# Patient Record
Sex: FEMALE | Race: WHITE
[De-identification: names, ages, dates, MRNs, and addresses within clinical notes are randomized per-mention and may not be internally consistent; named-entity substitution may affect disease eponyms.]

---

## 2019-02-27 ENCOUNTER — HOSPITAL ENCOUNTER (EMERGENCY)
Dept: HOSPITAL 92 - SCSER | Age: 34
Discharge: HOME | End: 2019-02-27
Payer: MEDICAID

## 2019-02-27 DIAGNOSIS — R10.31: ICD-10-CM

## 2019-02-27 DIAGNOSIS — O99.89: Primary | ICD-10-CM

## 2019-02-27 DIAGNOSIS — Z3A.01: ICD-10-CM

## 2019-02-27 LAB
ALBUMIN SERPL BCG-MCNC: 4.3 G/DL (ref 3.5–5)
ALP SERPL-CCNC: 66 U/L (ref 40–150)
ALT SERPL W P-5'-P-CCNC: 22 U/L (ref 8–55)
ANION GAP SERPL CALC-SCNC: 13 MMOL/L (ref 10–20)
AST SERPL-CCNC: 15 U/L (ref 5–34)
BILIRUB SERPL-MCNC: 0.4 MG/DL (ref 0.2–1.2)
BUN SERPL-MCNC: 10 MG/DL (ref 7–18.7)
CALCIUM SERPL-MCNC: 9.5 MG/DL (ref 7.8–10.44)
CHLORIDE SERPL-SCNC: 105 MMOL/L (ref 98–107)
CO2 SERPL-SCNC: 23 MMOL/L (ref 22–29)
CREAT CL PREDICTED SERPL C-G-VRATE: 0 ML/MIN (ref 70–130)
GLOBULIN SER CALC-MCNC: 3 G/DL (ref 2.4–3.5)
GLUCOSE SERPL-MCNC: 100 MG/DL (ref 70–105)
HGB BLD-MCNC: 11 G/DL (ref 12–16)
HGB BLD-MCNC: 13.1 G/DL (ref 12–16)
MCH RBC QN AUTO: 29.4 PG (ref 27–31)
MCV RBC AUTO: 87.5 FL (ref 78–98)
MDIFF COMPLETE?: YES
PLATELET # BLD AUTO: 247 THOU/UL (ref 130–400)
POTASSIUM SERPL-SCNC: 4.1 MMOL/L (ref 3.5–5.1)
RBC # BLD AUTO: 4.46 MILL/UL (ref 4.2–5.4)
SODIUM SERPL-SCNC: 137 MMOL/L (ref 136–145)
WBC # BLD AUTO: 11 THOU/UL (ref 4.8–10.8)

## 2019-02-27 PROCEDURE — 84702 CHORIONIC GONADOTROPIN TEST: CPT

## 2019-02-27 PROCEDURE — 85025 COMPLETE CBC W/AUTO DIFF WBC: CPT

## 2019-02-27 PROCEDURE — 96361 HYDRATE IV INFUSION ADD-ON: CPT

## 2019-02-27 PROCEDURE — 76856 US EXAM PELVIC COMPLETE: CPT

## 2019-02-27 PROCEDURE — 80053 COMPREHEN METABOLIC PANEL: CPT

## 2019-02-27 PROCEDURE — 96374 THER/PROPH/DIAG INJ IV PUSH: CPT

## 2019-02-27 NOTE — PDOC.EVN
Event Note





- Event Note


Event Note: 





OBGYN Consult from [facility] ED





Called by ED physician on 19 at 1245





CC: BHCG 3292 with no POC in uterus





HPI: 32 yo H with non-bleeding, mild pelvic pain. No HX clots or VB. Seen 

at that location with BHCG of 3296 and possible right "mas" under 1.5cm. I was 

called for possible ectopic care.





Review of Systems: as per HPI





Discussion: I talked with the patient on speaker phone along with Dr Ward. 

In [place], I discussed her findings and the possibility of ectopic. I 

discussed Methotrxate with her as emperic RX. The patient was a bit unsure of 

methotrexate, as she was concerned if the medication was given to a good 

pregnancy in error. I shared with her that she had a valid concern.





Dr Ward and I reviewed the 2018 ACOG New Bulletin on Ectopic, with 3500 set 

as the new "discriminatory" zone in stable patients.





Labs:


Hct 3296





RH type pending


HCT 39





Sono: as per HPI:


No free fluid on sono





No known risk factors for ectopic.





Assessment and plan:





Preg unknown location with unsure "mass" at under 1.5cm on right and BHCG 3296 

with no IUP. No VB


After detailed discussion on both emperic methotrexate vs expectant management, 

she has agreed on and prefers expectant care. I reviewed potential risks of 

tubal rupture if ectopic, and we have settled on short term follow up...repeat 

labs in 24-48 hours and see what BHCG doing as she is hemodynamically stable.





I have addressed this and ED precautions as well.





She will follow up with [facility] (Dr Ward to arrange) in 24-48 hours.





As misscarriages are more common than ectopic, I think this plan of close 

follow up is valid in light of the newer more conservative ACOG guidelines.





I have discussed this on the phone with Dr KIKO Ward and the patient in [place] 

via telemedicine.

## 2019-02-27 NOTE — ULT
PELVIC ULTRASOUND:

 

02/27/2019

 

 

HISTORY:

Positive outside urine pregnancy test.  Beta hCG level is not currently available.  The patient is ha
ving right pelvic pain.

 

TECHNIQUE:

Transabdominal and endovaginal sonographic images of the pelvis are obtained.

 

FINDINGS:

The uterus measures 8.8 cm x 4.4 cm x 5.6 cm.  The endometrial stripe measures 0.9 cm in thickness.  
No fluid or fluid collection is seen in the endometrial canal.

 

The ovaries are not visualized bilaterally.  There is a heterogeneous structures seen in the right ad
nexa, which measures 3 cm x 1.7 cm, and is in the expected location of the right ovary but does not h
ave the typical appearance to suggest that this does represent the right ovary.  An anechoic area is 
seen centrally, within this heterogeneous region.  In addition, there is adjacent fluid with increase
d echogenicity, suggesting complicated fluid within the right adnexal region.  The area of heterogene
ity in the right adnexal region measures 4.4 cm x 3 cm with a central area of decreased echogenicity 
measuring approximately 1.3 cm.  Ectopic pregnancy is a consideration, given the complicated adjacent
 fluid.

 

The left ovary is obscured by bowel gas.

 

A tiny amount of free fluid is seen adjacent to the inferior margin of the right hepatic lobe.

 

IMPRESSION:

1.  No fluid collection is seen in the endometrial canal to suggest an intrauterine gestation.  While
 findings could be related to an early intrauterine gestation, there is a heterogeneous structure wit
hin the right adnexal region, with adjacent complicated fluid.  Ectopic pregnancy cannot be excluded 
on this exam.  Correlation with quantitative beta hCG level is recommended, and ectopic pregnancy is 
a consideration.

 

2.  Tiny amount of free fluid adjacent to the right hepatic lobe.

 

3.  Non visualization of either ovary.

 

The above findings were discussed with Dr. Ward in the emergency department on 02/27/2019 at 1227 
hours.

 

CODE CR

 

POS: [facility]

## 2019-02-27 NOTE — ULT
PELVIC ULTRASOUND:

 

Date:  02/27/19 

 

HISTORY:  

Patient has a positive pregnancy test and features on an earlier ultrasound done today that raised th
e possibility of an ectopic in the right adnexal region. Patient now has increasing pain and H&H has 
been dropping. 

 

FINDINGS:

Real-time imaging of the pelvis was performed. Transabdominal, as well as endovaginal images obtained
. 

 

The overall quality of the examination is less on visualized adnexal structures as compared to the pr
ior exam. The uterus is once again noted to be normal in appearance. No signs that would suggest any 
type of intrauterine gestation. There is heterogeneity in the region of the right adnexa. Some of thi
s probably represents hemorrhage as there appears to be somewhat complex fluid. It is difficult to sa
y whether there has been a significant change since the previous examination as this and adjacent bow
el all merge. There was some minimal free fluid in Morison's pouch noted on the prior exam that is st
ill present, but not significantly different. The left ovary is never well visualized. 

 

IMPRESSION: 

Heterogeneity in the region of the right adnexa, which is ill-defined. It is difficult to say whether
 or not there has been a significant change in the appearance of the right adnexal region as compared
 to the prior exam. There is not a large amount of free fluid demonstrated. There is what appears to 
be some complex fluid difficult to define due to its similar echogenicity to adjacent structures. 

 

 

 

POS: [facility]

## 2019-10-04 ENCOUNTER — HOSPITAL ENCOUNTER (OUTPATIENT)
Dept: HOSPITAL 92 - SCSULT | Age: 34
Discharge: HOME | End: 2019-10-04
Attending: NURSE PRACTITIONER
Payer: MEDICAID

## 2019-10-04 DIAGNOSIS — N83.201: ICD-10-CM

## 2019-10-04 DIAGNOSIS — Z01.419: Primary | ICD-10-CM

## 2019-10-04 PROCEDURE — 76856 US EXAM PELVIC COMPLETE: CPT

## 2019-10-04 PROCEDURE — 93976 VASCULAR STUDY: CPT

## 2019-10-04 NOTE — ULT
ULTRASOUND PELVIC

DOPPLER DUPLEX:



DATE:

10/4/2019



HISTORY:

34-year-old female with right pelvic pain



TECHNIQUE:

Transabdominal transducer used to visualize intrapelvic contents with grayscale, color-flow, and spec
tral analysis. Endovaginal transducer not used.



FINDINGS:

Uterus: 9.5 x 4 x 5 cm.

Endometrial stripe: 0.7 cm (7 mm)

No moderate sized or large uterine leiomyoma identified.

Right ovary: 5.5 x 4 x 4.5 cm, expanded by a 4 x 3 x 2.5 cm cyst.

Left ovary: 3.5 x 1.5 x 2 cm.

Blood flow documented in bilateral ovaries.

No free fluid in the cul-de-sac



IMPRESSION:

4 cm right ovarian cyst.



Reported By: Vega Bustamante 

Electronically Signed:  10/4/2019 12:10 PM

## 2022-08-15 ENCOUNTER — HOSPITAL ENCOUNTER (OUTPATIENT)
Dept: HOSPITAL 92 - CSHULT | Age: 37
Discharge: HOME | End: 2022-08-15
Attending: OBSTETRICS & GYNECOLOGY
Payer: MEDICAID

## 2022-08-15 DIAGNOSIS — Z3A.21: ICD-10-CM

## 2022-08-15 DIAGNOSIS — O09.522: Primary | ICD-10-CM

## 2022-08-15 PROCEDURE — 76805 OB US >/= 14 WKS SNGL FETUS: CPT

## 2022-12-09 LAB
BASOPHILS # BLD AUTO: 0.1 10X3/UL (ref 0–0.2)
BASOPHILS NFR BLD AUTO: 0.9 % (ref 0–2)
EOSINOPHIL # BLD AUTO: 0.1 10X3/UL (ref 0–0.5)
EOSINOPHIL NFR BLD AUTO: 1.4 % (ref 0–6)
HBSAG INDEX: 0.32 S/CO (ref 0–0.99)
HGB BLD-MCNC: 11.3 G/DL (ref 12–15.5)
LYMPHOCYTES NFR BLD AUTO: 19.8 % (ref 18–47)
MCH RBC QN AUTO: 28 PG (ref 27–33)
MCV RBC AUTO: 81.2 FL (ref 81.6–98.3)
MONOCYTES # BLD AUTO: 0.4 10X3/UL (ref 0–1.1)
MONOCYTES NFR BLD AUTO: 5.9 % (ref 0–10)
NEUTROPHILS # BLD AUTO: 4.7 10X3/UL (ref 1.5–8.4)
NEUTROPHILS NFR BLD AUTO: 70.5 % (ref 40–75)
PLATELET # BLD AUTO: 168 10X3/UL (ref 150–450)
RBC # BLD AUTO: 4.04 10X6/UL (ref 3.9–5.03)
SYPHILIS ANTIBODY INDEX: 0.03 S/CO
WBC # BLD AUTO: 6.6 10X3/UL (ref 3.5–10.5)

## 2022-12-12 ENCOUNTER — HOSPITAL ENCOUNTER (INPATIENT)
Dept: HOSPITAL 92 - CSHLD | Age: 37
LOS: 3 days | Discharge: HOME | End: 2022-12-15
Attending: OBSTETRICS & GYNECOLOGY | Admitting: OBSTETRICS & GYNECOLOGY
Payer: COMMERCIAL

## 2022-12-12 VITALS — BODY MASS INDEX: 39.2 KG/M2

## 2022-12-12 DIAGNOSIS — O34.211: Primary | ICD-10-CM

## 2022-12-12 DIAGNOSIS — Z20.822: ICD-10-CM

## 2022-12-12 DIAGNOSIS — Z3A.39: ICD-10-CM

## 2022-12-12 DIAGNOSIS — Z79.899: ICD-10-CM

## 2022-12-12 PROCEDURE — 51702 INSERT TEMP BLADDER CATH: CPT

## 2022-12-12 PROCEDURE — 87340 HEPATITIS B SURFACE AG IA: CPT

## 2022-12-12 PROCEDURE — 36415 COLL VENOUS BLD VENIPUNCTURE: CPT

## 2022-12-12 PROCEDURE — U0002 COVID-19 LAB TEST NON-CDC: HCPCS

## 2022-12-12 PROCEDURE — 99285 EMERGENCY DEPT VISIT HI MDM: CPT

## 2022-12-12 PROCEDURE — 86850 RBC ANTIBODY SCREEN: CPT

## 2022-12-12 PROCEDURE — 86780 TREPONEMA PALLIDUM: CPT

## 2022-12-12 PROCEDURE — 86901 BLOOD TYPING SEROLOGIC RH(D): CPT

## 2022-12-12 PROCEDURE — 86900 BLOOD TYPING SEROLOGIC ABO: CPT

## 2022-12-12 PROCEDURE — 85027 COMPLETE CBC AUTOMATED: CPT

## 2022-12-12 PROCEDURE — 85025 COMPLETE CBC W/AUTO DIFF WBC: CPT

## 2022-12-12 PROCEDURE — S0028 INJECTION, FAMOTIDINE, 20 MG: HCPCS

## 2022-12-13 RX ADMIN — HYDROCODONE BITARTRATE AND ACETAMINOPHEN PRN TAB: 5; 325 TABLET ORAL at 15:42

## 2022-12-13 RX ADMIN — HYDROCODONE BITARTRATE AND ACETAMINOPHEN PRN TAB: 5; 325 TABLET ORAL at 08:55

## 2022-12-13 RX ADMIN — HYDROCODONE BITARTRATE AND ACETAMINOPHEN PRN TAB: 5; 325 TABLET ORAL at 21:53

## 2022-12-14 LAB
HGB BLD-MCNC: 8.9 G/DL (ref 12–15.5)
MCH RBC QN AUTO: 28.2 PG (ref 27–33)
MCV RBC AUTO: 83.2 FL (ref 81.6–98.3)
PLATELET # BLD AUTO: 158 10X3/UL (ref 150–450)
RBC # BLD AUTO: 3.16 10X6/UL (ref 3.9–5.03)
WBC # BLD AUTO: 7 10X3/UL (ref 3.5–10.5)

## 2022-12-14 RX ADMIN — HYDROCODONE BITARTRATE AND ACETAMINOPHEN PRN TAB: 5; 325 TABLET ORAL at 16:07

## 2022-12-14 RX ADMIN — HYDROCODONE BITARTRATE AND ACETAMINOPHEN PRN TAB: 5; 325 TABLET ORAL at 10:10

## 2022-12-14 RX ADMIN — HYDROCODONE BITARTRATE AND ACETAMINOPHEN PRN TAB: 5; 325 TABLET ORAL at 21:03

## 2022-12-14 RX ADMIN — HYDROCODONE BITARTRATE AND ACETAMINOPHEN PRN TAB: 5; 325 TABLET ORAL at 05:52

## 2022-12-15 VITALS — TEMPERATURE: 98.2 F | SYSTOLIC BLOOD PRESSURE: 130 MMHG | DIASTOLIC BLOOD PRESSURE: 70 MMHG

## 2022-12-15 RX ADMIN — HYDROCODONE BITARTRATE AND ACETAMINOPHEN PRN TAB: 5; 325 TABLET ORAL at 08:40

## 2022-12-15 RX ADMIN — HYDROCODONE BITARTRATE AND ACETAMINOPHEN PRN TAB: 5; 325 TABLET ORAL at 04:40
